# Patient Record
Sex: FEMALE | Race: WHITE | NOT HISPANIC OR LATINO
[De-identification: names, ages, dates, MRNs, and addresses within clinical notes are randomized per-mention and may not be internally consistent; named-entity substitution may affect disease eponyms.]

---

## 2017-11-06 PROCEDURE — 84480 ASSAY TRIIODOTHYRONINE (T3): CPT | Performed by: OBSTETRICS & GYNECOLOGY

## 2017-11-06 PROCEDURE — 83001 ASSAY OF GONADOTROPIN (FSH): CPT | Performed by: OBSTETRICS & GYNECOLOGY

## 2017-11-06 PROCEDURE — 82607 VITAMIN B-12: CPT | Performed by: OBSTETRICS & GYNECOLOGY

## 2017-11-06 PROCEDURE — 83002 ASSAY OF GONADOTROPIN (LH): CPT | Performed by: OBSTETRICS & GYNECOLOGY

## 2018-01-31 PROBLEM — I47.1 SVT (SUPRAVENTRICULAR TACHYCARDIA) (HCC): Status: ACTIVE | Noted: 2018-01-31

## 2018-01-31 PROBLEM — Z01.810 PRE-OPERATIVE CARDIOVASCULAR EXAMINATION: Status: ACTIVE | Noted: 2018-01-31

## 2018-01-31 PROBLEM — I47.10 SVT (SUPRAVENTRICULAR TACHYCARDIA) (HCC): Status: ACTIVE | Noted: 2018-01-31

## 2018-03-09 ENCOUNTER — LAB SERVICES (OUTPATIENT)
Dept: OTHER | Age: 48
End: 2018-03-09

## 2018-03-09 ENCOUNTER — CHARTING TRANS (OUTPATIENT)
Dept: OTHER | Age: 48
End: 2018-03-09

## 2018-03-09 LAB — RAPID STREP GROUP A: NORMAL

## 2018-03-13 LAB — CULTURE STREP GRP A (STTH) HL: NORMAL

## 2018-05-25 PROBLEM — R07.82 INTERCOSTAL PAIN: Status: ACTIVE | Noted: 2018-05-25

## 2018-09-21 PROBLEM — R10.12 LEFT UPPER QUADRANT PAIN: Status: ACTIVE | Noted: 2018-09-21

## 2018-09-21 PROBLEM — R10.84 GENERALIZED ABDOMINAL PAIN: Status: ACTIVE | Noted: 2018-09-21

## 2018-09-21 PROBLEM — R05.9 COUGH: Status: ACTIVE | Noted: 2018-09-21

## 2018-09-21 PROBLEM — R19.4 CHANGE IN BOWEL HABITS: Status: ACTIVE | Noted: 2018-09-21

## 2018-11-01 VITALS
HEART RATE: 60 BPM | HEIGHT: 66 IN | TEMPERATURE: 98.6 F | SYSTOLIC BLOOD PRESSURE: 114 MMHG | RESPIRATION RATE: 18 BRPM | BODY MASS INDEX: 28.93 KG/M2 | WEIGHT: 180 LBS | DIASTOLIC BLOOD PRESSURE: 80 MMHG

## 2019-01-07 PROCEDURE — 86765 RUBEOLA ANTIBODY: CPT | Performed by: INTERNAL MEDICINE

## 2019-01-07 PROCEDURE — 86762 RUBELLA ANTIBODY: CPT | Performed by: INTERNAL MEDICINE

## 2019-01-07 PROCEDURE — 86735 MUMPS ANTIBODY: CPT | Performed by: INTERNAL MEDICINE

## 2021-04-14 PROBLEM — M23.92 INTERNAL DERANGEMENT OF KNEE, LEFT: Status: ACTIVE | Noted: 2021-04-14

## 2021-04-14 PROBLEM — M25.552 HIP PAIN, LEFT: Status: ACTIVE | Noted: 2021-04-14

## 2021-04-16 PROBLEM — G89.29 CHRONIC PAIN OF LEFT KNEE: Status: ACTIVE | Noted: 2021-04-16

## 2021-04-16 PROBLEM — M25.562 CHRONIC PAIN OF LEFT KNEE: Status: ACTIVE | Noted: 2021-04-16

## 2021-04-16 PROBLEM — G89.29 CHRONIC HIP PAIN, LEFT: Status: ACTIVE | Noted: 2021-04-16

## 2021-04-16 PROBLEM — M25.552 CHRONIC HIP PAIN, LEFT: Status: ACTIVE | Noted: 2021-04-16

## 2022-04-06 ENCOUNTER — APPOINTMENT (OUTPATIENT)
Dept: DERMATOLOGY | Age: 52
End: 2022-04-06

## 2022-09-07 ENCOUNTER — OFFICE VISIT (OUTPATIENT)
Dept: DERMATOLOGY | Age: 52
End: 2022-09-07

## 2022-09-07 DIAGNOSIS — L72.3 SEBACEOUS CYST: ICD-10-CM

## 2022-09-07 DIAGNOSIS — D23.5 BENIGN NEOPLASM OF SKIN OF TRUNK, EXCEPT SCROTUM: ICD-10-CM

## 2022-09-07 DIAGNOSIS — L81.1 MELASMA: Primary | ICD-10-CM

## 2022-09-07 PROCEDURE — 99203 OFFICE O/P NEW LOW 30 MIN: CPT | Performed by: DERMATOLOGY

## 2025-04-02 ENCOUNTER — HOSPITAL ENCOUNTER (OUTPATIENT)
Dept: GENERAL RADIOLOGY | Age: 55
Discharge: HOME | End: 2025-04-02
Attending: PODIATRIST
Payer: COMMERCIAL

## 2025-04-02 ENCOUNTER — OFFICE VISIT (OUTPATIENT)
Facility: LOCATION | Age: 55
End: 2025-04-02
Payer: COMMERCIAL

## 2025-04-02 DIAGNOSIS — M79.673 PAIN OF FOOT, UNSPECIFIED LATERALITY: ICD-10-CM

## 2025-04-02 DIAGNOSIS — M20.5X2 ACQUIRED DIGITI QUINTI VARUS DEFORMITY OF LEFT FOOT: Primary | ICD-10-CM

## 2025-04-02 DIAGNOSIS — M79.672 LEFT FOOT PAIN: ICD-10-CM

## 2025-04-02 DIAGNOSIS — M89.8X9 EXOSTOSIS: ICD-10-CM

## 2025-04-02 DIAGNOSIS — L84 PRE-ULCERATIVE CALLUSES: ICD-10-CM

## 2025-04-02 PROCEDURE — 99203 OFFICE O/P NEW LOW 30 MIN: CPT | Performed by: PODIATRIST

## 2025-04-02 PROCEDURE — 73630 X-RAY EXAM OF FOOT: CPT | Performed by: PODIATRIST

## 2025-04-02 NOTE — PROGRESS NOTES
[facility]  Progress Note      Bhumika Ch is a 54 year old female.   Chief Complaint   Patient presents with    Foot Pain     Consult- L 4th and 5th toe pain 5/10 only w/ shoes and socks- onset- 3 wks ago- denies injury         HPI:     Patient is a pleasant 54-year-old female is presenting to clinic today with complaints of pain to her left fourth and fifth digits.  Patient states that she has a possible callus on the inside of the fifth digit that has become painful over the last 3 weeks.  She is unsure how long the callus has been there, but states that the pain has been worsening over the last 3 weeks.  Patient is ambulating in Uggs today.  She does share that she likes to utilize heels and narrow type shoes.  Denies noticing any signs of infection, but rates the pain 5/10 when in shoes with socks.  Patient has no other concerns at this time is presenting today for further evaluation and care      Allergies: Patient has no known allergies.   Current Outpatient Medications   Medication Sig Dispense Refill    TRAZODONE 50 MG Oral Tab TAKE 1 TABLET(50 MG) BY MOUTH EVERY NIGHT 90 tablet 0    Meloxicam 15 MG Oral Tab Take 1 tablet (15 mg total) by mouth daily. 30 tablet 1    Cholecalciferol (VITAMIN D3) 06627 units Oral Cap Take by mouth daily.        Multiple Vitamins-Minerals (MULTI-VITAMIN/MINERALS) Oral Tab Take 1 tablet by mouth daily.      Levonorgestrel (MIRENA, 52 MG,) 20 MCG/24HR Intrauterine IUD 1 each by Intrauterine route once.        Past Medical History:    Anemia    heavy periods    Bloating    Body piercing    ears    Constipation    Fatigue    Frequent urination    Frequent use of laxatives    miralax    H/O cervical biopsy    no malignancy    Heart palpitations    SVT    Heavy menses    have iud now to correct problem    Irregular bowel habits    Pain with bowel movements    sometimes (on left side)    Sleep disturbance    Stress    SVT (supraventricular tachycardia)    Uncomfortable  fullness after meals    Wears glasses    have both    Weight gain      Past Surgical History:   Procedure Laterality Date    Breast surgery procedure unlisted Bilateral     augmentation    Implant left Left 2010    Silicone    Implant right Right 2010    Silicone    Insert intrauterine device  2011    Mirena insert           x3      Family History   Problem Relation Age of Onset    Melanoma Mother     Cancer Mother         melanoma    Other (Other) Daughter         anorexia    High Cholesterol Father     Hypertension Father     Breast Cancer Paternal Aunt 50        50    Diabetes Maternal Uncle       Social History     Socioeconomic History    Marital status:     Number of children: 3   Occupational History    Occupation: housewife     Comment: online business   Tobacco Use    Smoking status: Never    Smokeless tobacco: Never   Substance and Sexual Activity    Alcohol use: Yes     Alcohol/week: 0.0 standard drinks of alcohol     Comment: rare    Drug use: No    Sexual activity: Yes     Partners: Male           REVIEW OF SYSTEMS:     10 point ROS completed and was negative unless stated in HPI.      EXAM:     Left lower extremity focused exam:  GENERAL: well developed, well nourished, in no apparent distress  EXTREMITIES:  1. Integument: Skin appears dry, warm, and supple. There are no color changes. No open lesions. No macerations.  HPK noted to medial and lateral aspects of left fifth digit.  There is no open ulceration noted upon debridement.  No current signs of infection.  2. Vascular: Dorsalis pedis 2/4 and posterior tibial pulse 2/4, capillary refill normal.  3. Neurological: Gross sensation intact via light touch.  Normal sharp/dull sensation  4. Musculoskeletal: Varus and plantar flex rotational deformity noted to left fifth digit, which does appear to be rubbing onto the lateral aspect of the fourth digit.  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  Chart history reviewed.    Baseline radiographs were obtained today of the left foot, revealing a varus rotational deformity of the fifth digit with a slight prominence to the lateral aspect of the fourth digit    Discussed both clinical and radiographic findings with patient today.    -Discussed the etiology of patient's painful callus today and treatment options for this    -Discussed the importance of added support to the feet and recommendations were given in regards to supportive shoe gear and over-the-counter inserts.  Can look into custom orthotics in the future    -Also discussed padding and insert modifications as needed.  Patient provided with toe spacers today.    -Patient also provided with and discussed proper moisturizing    -It is also recommended that patient rest, ice, and elevate in order to control inflammation    -Briefly discussed surgical treatment options, which would include an exostectomy of left fourth digit and a derotational arthroplasty of the fifth digit.  Will look into this if patient exhaust all conservative options      -All of the patient's questions and concerns were addressed.  They indicated their understanding of these issues and agrees to the plan.    Time spent reviewing pertinent information from patient's chart, reviewing any pertinent imaging, obtaining history and physical exam, discussing and mutually agreeing on a treatment plan, and documenting encounter: 35 minutes    RTC 6-8 weeks    Paresh Dale DPM, AACFAS        4/2/2025    [facility]  [address] [place] [id number]   [email or web address]             Dragon speech recognition software was used to prepare this note.  Errors in word recognition may occur.  Please contact me with any questions/concerns with this note.

## 2025-05-14 ENCOUNTER — OFFICE VISIT (OUTPATIENT)
Facility: LOCATION | Age: 55
End: 2025-05-14
Payer: COMMERCIAL

## 2025-05-14 DIAGNOSIS — M89.8X9 EXOSTOSIS: ICD-10-CM

## 2025-05-14 DIAGNOSIS — L84 PRE-ULCERATIVE CALLUSES: Primary | ICD-10-CM

## 2025-05-14 DIAGNOSIS — M20.5X2 ACQUIRED DIGITI QUINTI VARUS DEFORMITY OF LEFT FOOT: ICD-10-CM

## 2025-05-14 DIAGNOSIS — M79.672 LEFT FOOT PAIN: ICD-10-CM

## 2025-05-14 PROCEDURE — 99213 OFFICE O/P EST LOW 20 MIN: CPT | Performed by: PODIATRIST

## 2025-05-14 NOTE — PROGRESS NOTES
[facility]  Progress Note      Bhumika Ch is a 54 year old female.   Chief Complaint   Patient presents with    Follow - Up     Left foot f/u  Patient is not having pain         HPI:     Patient is a pleasant 54-year-old female who is returning to clinic today for recheck on callus to the left 4th and 5th digits.  Patient states that she has noticed improvements overall.  She did buy herself some toe spacers, which she does utilize on occasion.  She states that there may be a little bit of buildup to the calluses, but they are not currently painful.  Patient has also been moisturizing daily.  She is ambulating in dress shoes today they do have narrow toebox's.  She denies noticing any recent signs of infection.  Patient also has a few questions in regards to her overall foot health.  No other concerns at this time.        Allergies: Patient has no known allergies.   Current Medications[1]   Past Medical History[2]   Past Surgical History[3]   Family History[4]   Social Hx on file[5]        REVIEW OF SYSTEMS:     10 point ROS completed and was negative unless stated in HPI.      EXAM:     Left lower extremity focused exam:  GENERAL: well developed, well nourished, in no apparent distress  EXTREMITIES:  1. Integument: Skin appears dry, warm, and supple. There are no color changes. No open lesions. No macerations.  Minimal HPK noted to medial left fifth digit and lateral left fourth digit.  There are no open ulcerations noted upon debridement.  No current signs of infection.  2. Vascular: Dorsalis pedis 2/4 and posterior tibial pulse 2/4, capillary refill normal.  3. Neurological: Gross sensation intact via light touch.  Normal sharp/dull sensation  4. Musculoskeletal: Varus and plantar flex rotational deformity noted to left fifth digit, which does appear to be rubbing onto the lateral aspect of the fourth digit.  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  Chart history reviewed.    Patient has noticed overall improvements to her painful calluses with use of wider toebox shoes and toe spacers.  She is also moisturizing daily.    Mild HPK buildup to the lateral aspect of the left fourth digit.  This was debrided today utilizing curette without incident.    Patient to continue with current care regimen, moisturizing daily and utilizing wide toebox shoes and toe spacers.    If pain does return and all conservative measures fail, discussed exostectomy with derotational arthroplasty of left fifth digit.  No indication for procedure currently.    Discussed overall pedal hygiene with patient today and provided with online resources    -All of the patient's questions and concerns were addressed.  They indicated their understanding of these issues and agrees to the plan.    Time spent reviewing pertinent information from patient's chart, reviewing any pertinent imaging, obtaining history and physical exam, discussing and mutually agreeing on a treatment plan, and documenting encounter: 20 minutes    RTC as needed    Paresh Dale DPM, AACFAS        5/14/2025    [facility]  [address] [place] [id number]   [email or web address]            Dragon speech recognition software was used to prepare this note.  Errors in word recognition may occur.  Please contact me with any questions/concerns with this note.          [1]   Current Outpatient Medications   Medication Sig Dispense Refill    TRAZODONE 50 MG Oral Tab TAKE 1 TABLET(50 MG) BY MOUTH EVERY NIGHT 90 tablet 0    Meloxicam 15 MG Oral Tab Take 1 tablet (15 mg total) by mouth daily. 30 tablet 1    Cholecalciferol (VITAMIN  D3) 13116 units Oral Cap Take by mouth daily.        Multiple Vitamins-Minerals (MULTI-VITAMIN/MINERALS) Oral Tab Take 1 tablet by mouth daily.      Levonorgestrel (MIRENA, 52 MG,) 20 MCG/24HR Intrauterine IUD 1 each by Intrauterine route once.     [2]   Past Medical History:   Anemia    heavy periods    Bloating    Body piercing    ears    Constipation    Fatigue    Frequent urination    Frequent use of laxatives    miralax    H/O cervical biopsy    no malignancy    Heart palpitations    SVT    Heavy menses    have iud now to correct problem    Irregular bowel habits    Pain with bowel movements    sometimes (on left side)    Sleep disturbance    Stress    SVT (supraventricular tachycardia)    Uncomfortable fullness after meals    Wears glasses    have both    Weight gain   [3]   Past Surgical History:  Procedure Laterality Date    Breast surgery procedure unlisted Bilateral     augmentation    Implant left Left 2010    Silicone    Implant right Right 2010    Silicone    Insert intrauterine device  2011    Mirena insert           x3   [4]   Family History  Problem Relation Age of Onset    Melanoma Mother     Cancer Mother         melanoma    Other (Other) Daughter         anorexia    High Cholesterol Father     Hypertension Father     Breast Cancer Paternal Aunt 50        50    Diabetes Maternal Uncle    [5]   Social History  Socioeconomic History    Marital status:     Number of children: 3   Occupational History    Occupation: housewife     Comment: online business   Tobacco Use    Smoking status: Never    Smokeless tobacco: Never   Substance and Sexual Activity    Alcohol use: Yes     Alcohol/week: 0.0 standard drinks of alcohol     Comment: rare    Drug use: No    Sexual activity: Yes     Partners: Male

## 2025-05-14 NOTE — PROGRESS NOTES
The following individual(s) verbally consented to be recorded using ambient AI listening technology and understand that they can each withdraw their consent to this listening technology at any point by asking the clinician to turn off or pause the recording:    Patient name: Bhumika Ch  Additional names: